# Patient Record
Sex: MALE | Race: WHITE | NOT HISPANIC OR LATINO | Employment: FULL TIME | URBAN - METROPOLITAN AREA
[De-identification: names, ages, dates, MRNs, and addresses within clinical notes are randomized per-mention and may not be internally consistent; named-entity substitution may affect disease eponyms.]

---

## 2020-11-18 ENCOUNTER — OFFICE VISIT (OUTPATIENT)
Dept: PAIN MEDICINE | Facility: CLINIC | Age: 55
End: 2020-11-18

## 2020-11-18 ENCOUNTER — OFFICE VISIT (OUTPATIENT)
Dept: NEUROSURGERY | Facility: CLINIC | Age: 55
End: 2020-11-18

## 2020-11-18 VITALS
BODY MASS INDEX: 22.39 KG/M2 | DIASTOLIC BLOOD PRESSURE: 80 MMHG | SYSTOLIC BLOOD PRESSURE: 150 MMHG | WEIGHT: 156.4 LBS | HEIGHT: 70 IN | TEMPERATURE: 97.1 F

## 2020-11-18 VITALS
OXYGEN SATURATION: 98 % | TEMPERATURE: 98.7 F | DIASTOLIC BLOOD PRESSURE: 70 MMHG | WEIGHT: 153.8 LBS | HEART RATE: 57 BPM | HEIGHT: 70 IN | RESPIRATION RATE: 16 BRPM | SYSTOLIC BLOOD PRESSURE: 134 MMHG | BODY MASS INDEX: 22.02 KG/M2

## 2020-11-18 DIAGNOSIS — M54.59 MECHANICAL LOW BACK PAIN: ICD-10-CM

## 2020-11-18 DIAGNOSIS — M51.36 DEGENERATIVE DISC DISEASE, LUMBAR: ICD-10-CM

## 2020-11-18 DIAGNOSIS — M51.26 HNP (HERNIATED NUCLEUS PULPOSUS), LUMBAR: Primary | ICD-10-CM

## 2020-11-18 DIAGNOSIS — M51.26 LUMBAR DISC HERNIATION: Primary | ICD-10-CM

## 2020-11-18 DIAGNOSIS — M51.36 DDD (DEGENERATIVE DISC DISEASE), LUMBAR: ICD-10-CM

## 2020-11-18 DIAGNOSIS — M51.26 LUMBAR DISC HERNIATION: ICD-10-CM

## 2020-11-18 DIAGNOSIS — M54.16 LUMBAR RADICULOPATHY: ICD-10-CM

## 2020-11-18 PROCEDURE — 99203 OFFICE O/P NEW LOW 30 MIN: CPT | Performed by: PHYSICIAN ASSISTANT

## 2020-11-18 PROCEDURE — 99203 OFFICE O/P NEW LOW 30 MIN: CPT | Performed by: NURSE PRACTITIONER

## 2020-11-18 RX ORDER — MELOXICAM 15 MG/1
15 TABLET ORAL DAILY
COMMUNITY
End: 2022-08-10

## 2020-11-18 RX ORDER — HYDROCODONE BITARTRATE AND ACETAMINOPHEN 7.5; 325 MG/1; MG/1
1 TABLET ORAL EVERY 6 HOURS PRN
COMMUNITY
End: 2022-08-10

## 2020-11-18 NOTE — PROGRESS NOTES
"Chief Complaint: \"Lower back and left lower extremity pain.\"    History of Present Illness:   Patient: Mr. Jordon Munoz, 55 y.o. male   Referring Physician: Kita Fajardo PA-C  Reason for Referral: Consultation for chronic intractable lower back and left lower extremity pain.   Pain History: Patient reports a 3 years history of pain, which began without incident. Pain has progressed in intensity over the past 1 year.   Pain Description: constant pain with intermittent exacerbation, described as burning, numbing and pressure sensation.   Radiation of Pain:  The pain radiates into the posterior aspect of the gluteal region, left hip and anterolateral aspect of the thigh stopping above the knee. The leg pain is more severe than the lower back pain.  Pain intensity today: 3/10  Average pain intensity last week: 6/10  Pain intensity ranges from: 3/10 to 10/10  Aggravating factors: Pain increases with Pain increases with standing longer than 2-3 minutes, ambulating more than 2-3 minutes and certain activitites.  Alleviating factors: Pain decreases with sitting.   Associated Symptoms:   Patient reports pain, numbness and weakness in the left lower extremity.   Patient denies  any new bladder or bowel problems.   Patient denies  difficulties with his balance.     Review of previous therapies and additional medical records:  Jordon Munoz has already failed the following measures, including:   Conservative Measures: oral analgesics, opioids, topical analgesics, physical therapy, ice and heat   Interventional Measures: None  Surgical Measures: No history of lumbar spine surgery.   Jordon Munoz underwent neurosurgical  consultation with Kita Fajardo PA-C on 11/18/2020, and was found to be a surgical candidate.  Jordon Munoz is a healthy adult other than his chronic pain.  In terms of current analgesics, Jordon Munoz takes: Norco and Meloxicam.  I have reviewed [facility] Report #[id number] consistent to medication " reconciliation.      Global Pain Scale 11-18  2020          Pain 15          Feelings 0          Clinical outcomes 2          Activities 0          GPS Total: 17            Review of Diagnostic Studies:    MRI of the lumbar spine without contrast 9/11/2020: Radiology report: Mild to moderate levoscoliotic curvature at the L3-L4 level.  Minimal degenerative retrolisthesis of L3 on L4.  Moderate severe disc desiccation at L2-L3 and L5-S1.  Moderate reactive marrow edema seen at the L2-L3 and L5-S1 levels.  L2-L3 disc protrusion and disc herniation contribute to multifactorial mild central canal and moderate left L3 lateral recess and severe left L2 neural foraminal stenosis.  T12-L1: Normal.  L1-L2: Mild facet joint arthropathy.  L2-L3: Mild facet joint arthropathy.  Moderate disc desiccation with spondylitic disc protrusion, there is left lateral recess and foraminal disc herniation with a moderate left L3 lateral recess and moderate severe left L2 neural foraminal stenosis.  There is mild central canal stenosis with epidural lipomatosis.  L3-L4: Mild to moderate facet joint arthropathy.  There is moderate right L3 neural foraminal stenosis with mild bilateral L4 lateral recess stenosis.  L4-L5: Mild to moderate facet arthropathy.  Mild spondylitic disc bulge with borderline central canal stenosis.  Mild facet joint arthropathy.  Moderate to severe disc desiccation with diffuse spondylitic disc protrusion producing mild to moderate right and moderate left neural foraminal stenosis.    Review of Systems   All other systems reviewed and are negative.        There is no problem list on file for this patient.      Past Medical History:   Diagnosis Date   • Low back pain          No past surgical history on file.      Family History   Problem Relation Age of Onset   • Kidney disease Mother    • Other Father         lung cancer   • Heart disease Brother          Social History     Socioeconomic History   • Marital status:  "     Spouse name: Not on file   • Number of children: Not on file   • Years of education: Not on file   • Highest education level: Not on file   Tobacco Use   • Smoking status: Never Smoker   • Smokeless tobacco: Never Used   Substance and Sexual Activity   • Alcohol use: Never     Frequency: Never   • Drug use: Never   • Sexual activity: Defer           Current Outpatient Medications:   •  HYDROcodone-acetaminophen (NORCO) 7.5-325 MG per tablet, Take 1 tablet by mouth Every 6 (Six) Hours As Needed for Moderate Pain ., Disp: , Rfl:   •  meloxicam (MOBIC) 15 MG tablet, Take 15 mg by mouth Daily., Disp: , Rfl:       No Known Allergies      /70   Pulse 57   Temp 98.7 °F (37.1 °C)   Resp 16   Ht 177.8 cm (70\")   Wt 69.8 kg (153 lb 12.8 oz)   SpO2 98%   BMI 22.07 kg/m²       Physical Exam:  Constitutional: Patient appears well-developed and well-nourished.   HEENT: Head: Normocephalic and atraumatic.   Eyes: Conjunctivae and lids are normal.   Pupils: Equal, round, reactive to light.   Neck: Trachea normal. Neck supple. No JVD present.   Lymphatic: No cervical adenopathy  Pulmonary Respiratory effort: No increased work of breathing or signs of respiratory distress. Auscultation of lungs: Clear to auscultation.   Cardiovascular Auscultation of heart: Normal rate and rhythm, normal S1 and S2, no murmurs.   Peripheral vascular exam:  Femoral: right 2+, left 2+. Posterior tibialis: right 2+ and left 2+. Dorsalis pedis: right 2+ and left 2+. No edema.   Abdomen: The abdomen was soft and nontender. Bowel sounds were normal.   Musculoskeletal   Gait and station: Gait evaluation demonstrated a normal gait   Lumbar spine: Passive and active range of motion are limited secondary to pain. Extension, flexion, lateral flexion, rotation of the lumbar spine increased and reproduced pain. Lumbar facet joint loading maneuvers are positive.   Jair test and Gaenslen's test are negative.   Piriformis maneuvers are " negative.    Palpation of the bilateral ischial tuberosities, unrevealing.    Palpation of the bilateral greater trochanter, unrevealing.    Examination of the Iliotibial band: unrevealing.    Hip joints: The range of motion of the hip joints is full and without pain.   Neurological:   Patient is alert and oriented to person, place, and time.   Speech: speech is normal.   Cortical function: Normal mental status.   Cranial nerves: Cranial nerves 2-12 intact.   Reflex Scores:  Right patellar: 1+  Left patellar: 1+  Right Achilles: 1+  Left Achilles: 1+  Motor strength: 5/5  Motor Tone: normal tone.   Involuntary movements: none.   Superficial/Primitive Reflexes: primitive reflexes were absent.   Right Alvares: absent  Left Alvares: absent  Right ankle clonus: absent  Left ankle clonus: absent   Negative long tract signs. Straight leg raising test is negative. Femoral stretch sign is negative.   Sensation: No sensory loss. Sensory exam: intact to light touch, intact pain and temperature sensation, intact vibration sensation and normal proprioception.   Coordination: Normal finger to nose and heel to shin. Normal balance and  negative Romberg's sign   Skin and subcutaneous tissue: Skin is warm and intact. No rash noted. No cyanosis.   Psychiatric: Judgment and insight: Normal. Orientation to person, place and time: Normal. Recent and remote memory: Intact. Mood and affect: Normal.     ASSESSMENT:   1. Lumbar disc herniation    2. Lumbar radiculopathy    3. DDD (degenerative disc disease), lumbar          PLAN/MEDICAL DECISION MAKING:    Patient presents with a  3 year history of pain.  Patient reports he has experienced back pain in the past.  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  Recent MRI revealed a prominent disc herniation at L2-L3, which correlates with his symptoms and complaints.  He was evaluated today by neurosurgery, with Kita Fajardo,  DANIELA.  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  At the request of Kita Fajardo, we will proceed with a left sided selective epidural at L2-L3, to assist in alleviating his symptoms.  Patient has failed to obtain pain relief with conservative measures as referenced above as referenced above. I have reviewed all available patient's medical records as well as previous therapies as referenced above. I had a lengthy conversation with Mr. Jordon Munoz regarding his chronic pain condition and potential therapeutic options including risks, benefits, alternative therapies, to name a few. Therefore, I have proposed the following plan:  1. Pharmacological measures: Reviewed. Discussed.   A. Patient takes Mobic and Norco.  2. Interventional pain management measures: Patient will be scheduled for diagnostic and therapeutic left L2-L3 transforaminal epidural steroid injection. We may repeat epidural depending on patient's outcome.  Patient will follow-up with Dr. Kita Fleming PA-C.  3. Long-term rehabilitation efforts:  A. The patient does not have a history of falls. I did complete a risk assessment for falls.   B. Patient will start a comprehensive physical therapy program for reconditioning, therapeutic exercise, upper body strengthening/posture correction, core strengthening, gait and balance training, neurodynamics, myofascial release, cupping and dry needling  once pain is under control  C. Contrast therapy: Apply ice-packs for 15-20 minutes, followed by heating pads for 15-20 minutes to affected area   4. The patient has been instructed to contact my office with any questions or difficulties. The patient understands the plan and agrees to proceed accordingly.    Patient Care Team:  Rolly De Leon DO as PCP - General (Family Medicine)  Rolly De Leon DO as Referring  Physician (Family Medicine)     No orders of the defined types were placed in this encounter.        Future Appointments   Date Time Provider Department Center   11/23/2020  9:00 AM Oniel Ortega MD [facility]         [facility]     [facility] Dragon/Transcription disclaimer:  Much of this encounter note is an electronic transcription of spoken language to printed text. Electronic transcription of spoken language may permit erroneous, or at times, nonsensical words or phrases to be inadvertently transcribed. Although I have reviewed the note for such errors, some may still exist.

## 2020-11-18 NOTE — PROGRESS NOTES
Patient: Jordon Munoz  : 1965  GENDER: male    Primary Care Provider: Rolly De Leon DO    Requesting Provider: As above      History    Chief Complaint: low back, left hip and thigh pain with sensory alteration     History of Present Illness: Mr. Munoz is a 55-year-old gentleman who works for the local [employer] company.  He presents to our service with an left hip/anterior thigh pain with sensory alteration for the past 3 years.  He denies a history of trauma.  Symptoms are predominately confined to his low back, however with standing/walking >50 feet they do extend into his left hip, continuing anteriorly down his thigh - terminating at the top of his knee.  Symptoms do improve immediately upon sitting or laying down.  He denies right lower extremity involvement, bowel or bladder dysfunction, or overt weakness.  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  He takes the occasional meloxicam and Norco 7.5 per his PCP Dr. De Leon.  He has no other complaints at this time.    Review of Systems   Constitutional: Positive for activity change.   Musculoskeletal: Positive for back pain.       Past Medical History:   Diagnosis Date   • Low back pain      History reviewed. No pertinent surgical history.    Current Outpatient Medications:   •  HYDROcodone-acetaminophen (NORCO) 7.5-325 MG per tablet, Take 1 tablet by mouth Every 6 (Six) Hours As Needed for Moderate Pain ., Disp: , Rfl:   •  meloxicam (MOBIC) 15 MG tablet, Take 15 mg by mouth Daily., Disp: , Rfl:     No Known Allergies    The patient's review of systems, past medical history, past surgical history, family history, and social history have been reviewed at length in the electronic medical record.    Physical Exam:   /80 (BP Location: Left arm, Patient Position: Sitting, Cuff Size: Adult)   Temp  "97.1 °F (36.2 °C) (Infrared)   Ht 177.8 cm (70\")   Wt 70.9 kg (156 lb 6.4 oz)   BMI 22.44 kg/m²   CONSTITUTIONAL:   - Patient is well-nourished  - Pleasant and appears stated age.  PSYCHIATRIC:  - Normal mood and affect  - Behavior is normal.  - Thought content is normal  HENT:   Head: Normocephalic and Atraumatic.   Eyes:     - Pupils are equal, round, and reactive to light.     - EOM are normal.   CV:   - Regular rate and rhythm on palpable radial pulse   - No murmur appreciated   PULMONARY:   - Speaking in full sentences  - No use of accessory muscles   - Breathing is non-labored   - No wheezing   SKIN:  - Clean, dry and intact   MUSCULOSKELETAL:  - Neck/Back tenderness to palpation is not observed.   - ROM in neck/back is normal.  - Straight leg raise is negative   - Jair's sign is negative   NEUROLOGICAL:  - A&Ox3  - Attention span, language function, and cognition are intact.  - Strength is intact in the upper and lower extremities to direct testing.  - Muscle tone is normal throughout.  - Station and gait are normal.  - Sensation is intact to light touch testing throughout.  - Deep tendon reflexes are 1+ and symmetrical.    - Alvares's Sign is negative bilaterally.  - No clonus is elicited.  - Coordination is intact.    Patient's Body mass index is 22.44 kg/m². BMI is within normal parameters. No follow-up required..         Medical Decision Making    Data Review:   1. MRI Lumbar Spine (9/11/2020): Demonstrates diffuse degenerative disc disease with degenerative disc space collapse and modic and plate changes most severe at L2-3.  Additionally at L2-3 there is a large disc protrusion leftward producing moderate to severe lateral recess narrowing and foraminal stenosis.      Diagnosis/Treatment Options:  1. HNP (herniated nucleus pulposus), lumbar  2. Lumbar radiculopathy  3. Degenerative disc disease, lumbar  4. Mechanical low back pain    - Ambulatory Referral to Pain Management      Follow up:  Mr." Alexander is seen today with low back, left hip, and anterior thigh pain with sensory alteration extending to his knee.  After reviewing his MRI with Dr. Banda, there is a disc extrusion leftward at L2-3 that provides clinical correlation.  Unfortunately, the patient is not interested in pursuing surgical intervention given his current family constraints.  I referred him to Dr. Ortega for consideration of an epidural injection selectively on the left at L2-3.  Patient will follow-up in the office thereafter on an as-needed basis.    Kita Ray PA-C   11/18/2020   11:49 EST

## 2020-11-19 DIAGNOSIS — M51.26 LUMBAR DISC HERNIATION: Primary | ICD-10-CM

## 2020-11-23 ENCOUNTER — OUTSIDE FACILITY SERVICE (OUTPATIENT)
Dept: PAIN MEDICINE | Facility: CLINIC | Age: 55
End: 2020-11-23

## 2020-11-23 PROCEDURE — 64483 NJX AA&/STRD TFRM EPI L/S 1: CPT | Performed by: ANESTHESIOLOGY

## 2020-11-23 PROCEDURE — 99152 MOD SED SAME PHYS/QHP 5/>YRS: CPT | Performed by: ANESTHESIOLOGY

## 2020-11-24 ENCOUNTER — TELEPHONE (OUTPATIENT)
Dept: PAIN MEDICINE | Facility: CLINIC | Age: 55
End: 2020-11-24

## 2020-11-24 NOTE — TELEPHONE ENCOUNTER
DX TX LEFT L2-L3 TRANS LOBITO .  11/23/20.    Called patient to check on him after his procedure but no answer I LVM stating that if he felt he needed another set of TFESI that he could call back and schedule a Telehealth visit with Julianna.

## 2022-08-08 NOTE — PROGRESS NOTES
Patient: Jordon Munoz    YOB: 1965    Date: 08/10/2022    Primary Care Provider: Rolly De Leon DO    Chief Complaint   Patient presents with   • Groin Pain     Right side        SUBJECTIVE:    History of present illness:  I saw the patient in the office today as a consultation for evaluation and treatment of a right inguinal hernia. Patient states that he has intermittent pain, denies any changes to urinary concerns, or swelling to scrotum. Patient first noticed the area with in the last few months it has become more persistent states that he believe he has had the hernia for approximately 1 year .    This does cause him dull  Discomfort, located right groin, associated with palpable mass, worse with heavy lifting, relieved with rest, present for 12 months.    The following portions of the patient's history were reviewed and updated as appropriate: allergies, current medications, past family history, past medical history, past social history, past surgical history and problem list.    Review of Systems   Constitutional: Negative for chills, fever and unexpected weight change.   HENT: Negative for trouble swallowing and voice change.    Eyes: Negative for visual disturbance.   Respiratory: Negative for apnea, cough, chest tightness, shortness of breath and wheezing.    Cardiovascular: Negative for chest pain, palpitations and leg swelling.   Gastrointestinal: Negative for abdominal distention, abdominal pain, anal bleeding, blood in stool, constipation, diarrhea, nausea, rectal pain and vomiting.   Endocrine: Negative for cold intolerance and heat intolerance.   Genitourinary: Positive for testicular pain. Negative for difficulty urinating, dysuria, flank pain and scrotal swelling.   Musculoskeletal: Negative for back pain, gait problem and joint swelling.   Skin: Negative for color change, rash and wound.   Neurological: Negative for dizziness, syncope, speech difficulty, weakness, numbness  "and headaches.   Hematological: Negative for adenopathy. Does not bruise/bleed easily.   Psychiatric/Behavioral: Negative for confusion. The patient is not nervous/anxious.        History:  Past Medical History:   Diagnosis Date   • Low back pain        History reviewed. No pertinent surgical history.    Family History   Problem Relation Age of Onset   • Kidney disease Mother    • Other Father         lung cancer   • Heart disease Brother        Social History     Tobacco Use   • Smoking status: Current Every Day Smoker   • Smokeless tobacco: Never Used   Substance Use Topics   • Alcohol use: Never   • Drug use: Never       Allergies:  No Known Allergies    Medications:    Current Outpatient Medications:   •  atorvastatin (LIPITOR) 40 MG tablet, , Disp: , Rfl:   •  ibuprofen (ADVIL,MOTRIN) 600 MG tablet, , Disp: , Rfl:     OBJECTIVE:    Vital Signs:   Vitals:    08/10/22 1401   BP: 138/64   Pulse: 88   Temp: 98.1 °F (36.7 °C)   SpO2: 99%   Weight: 64.5 kg (142 lb 3.2 oz)   Height: 175.3 cm (69\")       Physical Exam:   General Appearance:    Alert, cooperative, in no acute distress   Head:    Normocephalic, without obvious abnormality, atraumatic   Eyes:            Lids and lashes normal, conjunctivae and sclerae normal, no   icterus, no pallor, corneas clear, PERRLA   Ears:    Ears appear intact with no abnormalities noted   Throat:   No oral lesions, no thrush, oral mucosa moist   Neck:   No adenopathy, supple, trachea midline, no thyromegaly, no   carotid bruit, no JVD   Lungs:     Clear to auscultation,respirations regular, even and                  unlabored    Heart:    Regular rhythm and normal rate, normal S1 and S2, no            murmur   Abdomen:     no masses, no organomegaly, soft non-tender, non-distended, no guarding, there is evidence of a reducible right inguinal hernia   Extremities:   Moves all extremities well, no edema, no cyanosis, no             redness   Pulses:   Pulses palpable and equal " bilaterally   Skin:   No bleeding, bruising or rash   Lymph nodes:   No palpable adenopathy   Neurologic:   Cranial nerves 2 - 12 grossly intact, sensation intact        Results Review:   I reviewed the patient's new clinical results.  I reviewed the patient's new imaging results and agree with the interpretation.  I reviewed the patient's other test results and agree with the interpretation    Review of Systems was reviewed and confirmed as accurate as documented by the MA.    ASSESSMENT/PLAN:    1. Inguinal pain, unspecified laterality        I had a detailed and extensive discussion with the patient in the office and they understand that they need to undergo hernia repair with mesh.  Full risks and benefits of operative versus nonoperative intervention were discussed with the patient and these included things such as nonresolution of symptoms and possible worsening of symptoms without surgical intervention versus infection, bleeding, possible recurrent hernia, possible postoperative neuralgia from nerve damage or involvement with scar tissue, etc.  The patient understands, agrees, and had no questions for me at the end of the office visit.     I discussed the patients findings and my recommendations with patient.    Electronically signed by Sy Jordan MD  08/17/22

## 2022-08-10 ENCOUNTER — OFFICE VISIT (OUTPATIENT)
Dept: SURGERY | Facility: CLINIC | Age: 57
End: 2022-08-10

## 2022-08-10 VITALS
SYSTOLIC BLOOD PRESSURE: 138 MMHG | HEIGHT: 69 IN | HEART RATE: 88 BPM | WEIGHT: 142.2 LBS | TEMPERATURE: 98.1 F | DIASTOLIC BLOOD PRESSURE: 64 MMHG | BODY MASS INDEX: 21.06 KG/M2 | OXYGEN SATURATION: 99 %

## 2022-08-10 DIAGNOSIS — Z01.818 PRE-OP TESTING: Primary | ICD-10-CM

## 2022-08-10 DIAGNOSIS — R10.30 INGUINAL PAIN, UNSPECIFIED LATERALITY: Primary | ICD-10-CM

## 2022-08-10 PROCEDURE — 99203 OFFICE O/P NEW LOW 30 MIN: CPT | Performed by: SURGERY

## 2022-08-10 RX ORDER — ATORVASTATIN CALCIUM 40 MG/1
TABLET, FILM COATED ORAL
COMMUNITY
Start: 2022-07-07

## 2022-08-10 RX ORDER — IBUPROFEN 600 MG/1
TABLET ORAL
COMMUNITY
Start: 2022-07-07

## 2022-09-01 ENCOUNTER — TELEPHONE (OUTPATIENT)
Dept: SURGERY | Facility: CLINIC | Age: 57
End: 2022-09-01

## 2022-09-06 ENCOUNTER — OUTSIDE FACILITY SERVICE (OUTPATIENT)
Dept: SURGERY | Facility: CLINIC | Age: 57
End: 2022-09-06

## 2022-09-06 DIAGNOSIS — R10.30 INGUINAL PAIN, UNSPECIFIED LATERALITY: Primary | ICD-10-CM

## 2022-09-06 PROCEDURE — 49505 PRP I/HERN INIT REDUC >5 YR: CPT | Performed by: SURGERY

## 2022-09-06 RX ORDER — HYDROCODONE BITARTRATE AND ACETAMINOPHEN 7.5; 325 MG/1; MG/1
1 TABLET ORAL EVERY 6 HOURS PRN
Qty: 15 TABLET | Refills: 0 | Status: SHIPPED | OUTPATIENT
Start: 2022-09-06

## 2022-09-15 NOTE — PROGRESS NOTES
"Patient: Jordon Munoz    YOB: 1965    Date: 09/19/2022    Primary Care Provider: Rolly De Leon DO    Chief Complaint   Patient presents with   • Post-op     Right inguinal hernia repair       History of present illness:  I saw the patient in the office today as a followup from their recent right inguinal hernia repair.  They state that they have done well and are having no complaints.    Vital Signs:   Vitals:    09/19/22 1013   BP: 132/76   Pulse: (!) 48   Temp: 97 °F (36.1 °C)   SpO2: 100%   Weight: 64.9 kg (143 lb)   Height: 175.3 cm (69\")       Physical Exam:   General Appearance:    Alert, cooperative, in no acute distress   Abdomen:     no masses, no organomegaly, soft non-tender, non-distended, no guarding, wounds are well healed, no evidence of recurrent hernia     Assessment / Plan:    1. Postoperative visit        I did discuss the situation with the patient today in the office and they have done well from their recent herniorraphy.  I have released the patient back to normal activity, they understand that they need to be careful about heavy lifting.  I need to see the patient back in the office only if they are having further problems, they know to call me if they are.    Electronically signed by Sy Jordan MD  09/19/22                "

## 2022-09-19 ENCOUNTER — OFFICE VISIT (OUTPATIENT)
Dept: SURGERY | Facility: CLINIC | Age: 57
End: 2022-09-19

## 2022-09-19 VITALS
SYSTOLIC BLOOD PRESSURE: 132 MMHG | HEIGHT: 69 IN | TEMPERATURE: 97 F | DIASTOLIC BLOOD PRESSURE: 76 MMHG | HEART RATE: 48 BPM | WEIGHT: 143 LBS | OXYGEN SATURATION: 100 % | BODY MASS INDEX: 21.18 KG/M2

## 2022-09-19 DIAGNOSIS — Z48.89 POSTOPERATIVE VISIT: Primary | ICD-10-CM

## 2022-09-19 PROCEDURE — 99024 POSTOP FOLLOW-UP VISIT: CPT | Performed by: SURGERY
